# Patient Record
Sex: FEMALE | Race: AMERICAN INDIAN OR ALASKA NATIVE
[De-identification: names, ages, dates, MRNs, and addresses within clinical notes are randomized per-mention and may not be internally consistent; named-entity substitution may affect disease eponyms.]

---

## 2018-05-07 ENCOUNTER — HOSPITAL ENCOUNTER (EMERGENCY)
Dept: HOSPITAL 5 - ED | Age: 48
Discharge: HOME | End: 2018-05-07
Payer: SELF-PAY

## 2018-05-07 VITALS — SYSTOLIC BLOOD PRESSURE: 128 MMHG | DIASTOLIC BLOOD PRESSURE: 57 MMHG

## 2018-05-07 DIAGNOSIS — D50.9: Primary | ICD-10-CM

## 2018-05-07 DIAGNOSIS — Z91.040: ICD-10-CM

## 2018-05-07 DIAGNOSIS — F32.9: ICD-10-CM

## 2018-05-07 LAB
ALBUMIN SERPL-MCNC: 4 G/DL (ref 3.9–5)
ALT SERPL-CCNC: 9 UNITS/L (ref 7–56)
APTT BLD: 31 SEC. (ref 24.2–36.6)
BASOPHILS # (AUTO): 0 K/MM3 (ref 0–0.1)
BASOPHILS NFR BLD AUTO: 0.5 % (ref 0–1.8)
BUN SERPL-MCNC: 12 MG/DL (ref 7–17)
BUN/CREAT SERPL: 13 %
CALCIUM SERPL-MCNC: 9 MG/DL (ref 8.4–10.2)
EOSINOPHIL # BLD AUTO: 0.1 K/MM3 (ref 0–0.4)
EOSINOPHIL NFR BLD AUTO: 1 % (ref 0–4.3)
HCT VFR BLD CALC: 25.1 % (ref 30.3–42.9)
HEMOLYSIS INDEX: 19
HGB BLD-MCNC: 8.1 GM/DL (ref 10.1–14.3)
INR PPP: 1.04 (ref 0.87–1.13)
LIPASE SERPL-CCNC: 27 UNITS/L (ref 13–60)
LYMPHOCYTES # BLD AUTO: 0.8 K/MM3 (ref 1.2–5.4)
LYMPHOCYTES NFR BLD AUTO: 15.7 % (ref 13.4–35)
MCH RBC QN AUTO: 24 PG (ref 28–32)
MCHC RBC AUTO-ENTMCNC: 32 % (ref 30–34)
MCV RBC AUTO: 74 FL (ref 79–97)
MONOCYTES # (AUTO): 0.4 K/MM3 (ref 0–0.8)
MONOCYTES % (AUTO): 6.9 % (ref 0–7.3)
PLATELET # BLD: 392 K/MM3 (ref 140–440)
RBC # BLD AUTO: 3.38 M/MM3 (ref 3.65–5.03)

## 2018-05-07 PROCEDURE — 85025 COMPLETE CBC W/AUTO DIFF WBC: CPT

## 2018-05-07 PROCEDURE — 93010 ELECTROCARDIOGRAM REPORT: CPT

## 2018-05-07 PROCEDURE — 86901 BLOOD TYPING SEROLOGIC RH(D): CPT

## 2018-05-07 PROCEDURE — 85730 THROMBOPLASTIN TIME PARTIAL: CPT

## 2018-05-07 PROCEDURE — 84703 CHORIONIC GONADOTROPIN ASSAY: CPT

## 2018-05-07 PROCEDURE — 86900 BLOOD TYPING SEROLOGIC ABO: CPT

## 2018-05-07 PROCEDURE — 36415 COLL VENOUS BLD VENIPUNCTURE: CPT

## 2018-05-07 PROCEDURE — 83690 ASSAY OF LIPASE: CPT

## 2018-05-07 PROCEDURE — 93005 ELECTROCARDIOGRAM TRACING: CPT

## 2018-05-07 PROCEDURE — 99284 EMERGENCY DEPT VISIT MOD MDM: CPT

## 2018-05-07 PROCEDURE — 85379 FIBRIN DEGRADATION QUANT: CPT

## 2018-05-07 PROCEDURE — 80053 COMPREHEN METABOLIC PANEL: CPT

## 2018-05-07 PROCEDURE — 71046 X-RAY EXAM CHEST 2 VIEWS: CPT

## 2018-05-07 PROCEDURE — 96360 HYDRATION IV INFUSION INIT: CPT

## 2018-05-07 PROCEDURE — 85610 PROTHROMBIN TIME: CPT

## 2018-05-07 PROCEDURE — 70450 CT HEAD/BRAIN W/O DYE: CPT

## 2018-05-07 PROCEDURE — 86850 RBC ANTIBODY SCREEN: CPT

## 2018-05-07 NOTE — EMERGENCY DEPARTMENT REPORT
ED Shortness of Breath HPI





- General


Chief Complaint: Weakness


Stated Complaint: DIZZY


Time Seen by Provider: 05/07/18 10:21


Source: patient


Mode of arrival: Ambulatory


Limitations: No Limitations





- History of Present Illness


Initial Comments: 





47-year-old female with a past medical history of depression and iron 

deficiency anemia presents to the hospital with complains of dyspnea on exertion

, lightheadachedness, nausea, and fatigue that started upon waking this 

morning.  Patient has had similar symptoms in the past related to anemia as 

required blood iron transfusions.  She is not currently taking her iron 

tablets.  Patient also frequently travels in has been back and forth to 

[place] for the past 3 weeks and recently returned to [place] on the fourth.

  Patient denies chest pain, calf tenderness, edema, history of PE/DVT.  On the 

fourth she states she was at a hospital in [place] and states her hemoglobin 

was "6.1" she was treated with IV fluids without blood transfusion and was 

subsequently discharged.





- Related Data


 Home Medications











 Medication  Instructions  Recorded  Confirmed  Last Taken


 


Bupropion HCl [Wellbutrin XL] 300 mg PO QDAY 05/07/18 05/07/18 Unknown


 


Ergocalciferol [Vitamin D2] 1 cap PO QWEEK 05/07/18 05/07/18 Unknown


 


Melatonin 5 mg PO HS 05/07/18 05/07/18 Unknown


 


Multivit-Min/Iron/Folic/Exk442 1 each PO DAILY 05/07/18 05/07/18 Unknown





[Hair, Skin and Nails Tablet]    











 Allergies











Allergy/AdvReac Type Severity Reaction Status Date / Time


 


latex Allergy  Unknown Verified 05/07/18 08:39














ED Review of Systems


ROS: 


Stated complaint: DIZZY


Other details as noted in HPI





Comment: All other systems reviewed and negative





ED Past Medical Hx





- Past Medical History


Previous Medical History?: Yes


Hx Psychiatric Treatment: Yes (depression)


Additional medical history: Anemia, Iron transfusions





- Surgical History


Past Surgical History?: No





- Social History


Smoking Status: Never Smoker


Substance Use Type: Prescribed





- Medications


Home Medications: 


 Home Medications











 Medication  Instructions  Recorded  Confirmed  Last Taken  Type


 


Bupropion HCl [Wellbutrin XL] 300 mg PO QDAY 05/07/18 05/07/18 Unknown History


 


Ergocalciferol [Vitamin D2] 1 cap PO QWEEK 05/07/18 05/07/18 Unknown History


 


Melatonin 5 mg PO HS 05/07/18 05/07/18 Unknown History


 


Multivit-Min/Iron/Folic/Lzz446 1 each PO DAILY 05/07/18 05/07/18 Unknown History





[Hair, Skin and Nails Tablet]     














ED Physical Exam





- General


Limitations: No Limitations





- Other


Other exam information: 





General: No limitations, patient is alert in no acute distress


Head exam: Atraumatic, normocephalic


Eyes exam: Normal appearance, pupils equal reactive to light, extraocular 

movements intact.  Visual acuity without corrective lenses left eye 20/20, 

right eye 20/25, both eyes 20/25


ENT: Moist mucous membrane, normal oropharynx


Neck exam: Normal inspection, full range of motion, no meningismus nontender


Respiratory exam: Clear to auscultation bilateral, no wheezes, rales, crackles


Cardiovascular: Normal rate and rhythm, normal heart sounds


Abdomen: Soft, nondistended, and  nontender, with normal bowel sounds, no 

rebound, or guarding


Extremity: Full range of motion normal inspection no deformity, no calf 

tenderness or edema


Back: Normal Inspection, full range of motion, no tenderness


Neurologic: Alert, oriented x3, cranial nerves intact, no motor or sensory 

deficit


Psychiatric: normal affect, normal mood


Skin: Warm, dry, intact





ED Course


 Vital Signs











  05/07/18 05/07/18 05/07/18





  08:39 09:12 09:15


 


Temperature 98.4 F  


 


Pulse Rate 106 H 80 75


 


Pulse Rate [   





Lying]   


 


Pulse Rate [   





Sitting]   


 


Pulse Rate [   





Standing]   


 


Respiratory 18 28 H 30 H





Rate   


 


Blood Pressure 112/76  129/66


 


Blood Pressure   





[Left]   


 


Blood Pressure   





[Lying]   


 


Blood Pressure   





[Sitting]   


 


Blood Pressure   





[Standing]   


 


O2 Sat by Pulse 100 100 100





Oximetry   














  05/07/18 05/07/18 05/07/18





  09:30 09:33 09:45


 


Temperature   


 


Pulse Rate 64  65


 


Pulse Rate [   





Lying]   


 


Pulse Rate [   





Sitting]   


 


Pulse Rate [   





Standing]   


 


Respiratory 21 32 H 10 L





Rate   


 


Blood Pressure 121/64  121/64


 


Blood Pressure   





[Left]   


 


Blood Pressure   





[Lying]   


 


Blood Pressure   





[Sitting]   


 


Blood Pressure   





[Standing]   


 


O2 Sat by Pulse 100  100





Oximetry   














  05/07/18 05/07/18 05/07/18





  10:00 10:15 10:31


 


Temperature   


 


Pulse Rate 62 71 79


 


Pulse Rate [   





Lying]   


 


Pulse Rate [   





Sitting]   


 


Pulse Rate [   





Standing]   


 


Respiratory 16 12 22





Rate   


 


Blood Pressure 127/68 127/68 


 


Blood Pressure   120/73





[Left]   


 


Blood Pressure   





[Lying]   


 


Blood Pressure   





[Sitting]   


 


Blood Pressure   





[Standing]   


 


O2 Sat by Pulse 100 100 100





Oximetry   














  05/07/18 05/07/18





  12:00 12:15


 


Temperature  


 


Pulse Rate  


 


Pulse Rate [ 70 





Lying]  


 


Pulse Rate [ 69 





Sitting]  


 


Pulse Rate [ 73 





Standing]  


 


Respiratory  18





Rate  


 


Blood Pressure  


 


Blood Pressure  128/57





[Left]  


 


Blood Pressure 104/58 





[Lying]  


 


Blood Pressure 118/61 





[Sitting]  


 


Blood Pressure 112/64 





[Standing]  


 


O2 Sat by Pulse  100





Oximetry  














- Reevaluation(s)


Reevaluation #1: 





05/07/18 12:47


Patient room air O2 sat 100%.  Tolerating exertion without difficulty.  When 

reassessed at this time patient states that her head "feels fuzzy and cloudy".  

When asked to clarify she says her vision appears to be a little blurry.  

Apparently this has been present since her initial presentation but only 

mentioned at this point.  Patient denies any headache.  Neurologic exam 

performed NIH score of 0.  Visual acuity testing ordered a CT will be 

performed. Post IVF orthostatics unremarkable








ED Medical Decision Making





- Lab Data


Result diagrams: 


 05/07/18 08:57





 05/07/18 08:57








 Lab Results











  05/07/18 05/07/18 05/07/18 Range/Units





  08:57 08:57 08:57 


 


WBC  5.4    (4.5-11.0)  K/mm3


 


RBC  3.38 L    (3.65-5.03)  M/mm3


 


Hgb  8.1 L    (10.1-14.3)  gm/dl


 


Hct  25.1 L    (30.3-42.9)  %


 


MCV  74 L    (79-97)  fl


 


MCH  24 L    (28-32)  pg


 


MCHC  32    (30-34)  %


 


RDW  18.4 H    (13.2-15.2)  %


 


Plt Count  392    (140-440)  K/mm3


 


Lymph % (Auto)  15.7    (13.4-35.0)  %


 


Mono % (Auto)  6.9    (0.0-7.3)  %


 


Eos % (Auto)  1.0    (0.0-4.3)  %


 


Baso % (Auto)  0.5    (0.0-1.8)  %


 


Lymph #  0.8 L    (1.2-5.4)  K/mm3


 


Mono #  0.4    (0.0-0.8)  K/mm3


 


Eos #  0.1    (0.0-0.4)  K/mm3


 


Baso #  0.0    (0.0-0.1)  K/mm3


 


Seg Neutrophils %  75.9 H    (40.0-70.0)  %


 


Seg Neutrophils #  4.1    (1.8-7.7)  K/mm3


 


PT   14.1   (12.2-14.9)  Sec.


 


INR   1.04   (0.87-1.13)  


 


APTT   31.0   (24.2-36.6)  Sec.


 


D-Dimer     (0-234)  ng/mlDDU


 


Sodium    135 L  (137-145)  mmol/L


 


Potassium    4.1  (3.6-5.0)  mmol/L


 


Chloride    99.6  ()  mmol/L


 


Carbon Dioxide    21 L  (22-30)  mmol/L


 


Anion Gap    19  mmol/L


 


BUN    12  (7-17)  mg/dL


 


Creatinine    0.9  (0.7-1.2)  mg/dL


 


Estimated GFR    > 60  ml/min


 


BUN/Creatinine Ratio    13  %


 


Glucose    129 H  ()  mg/dL


 


Calcium    9.0  (8.4-10.2)  mg/dL


 


Total Bilirubin    0.20  (0.1-1.2)  mg/dL


 


AST    17  (5-40)  units/L


 


ALT    9  (7-56)  units/L


 


Alkaline Phosphatase    59  ()  units/L


 


Total Protein    7.3  (6.3-8.2)  g/dL


 


Albumin    4.0  (3.9-5)  g/dL


 


Albumin/Globulin Ratio    1.2  %


 


Lipase    27  (13-60)  units/L


 


HCG, Qual     (Negative)  


 


Blood Type     


 


Antibody Screen     














  05/07/18 05/07/18 05/07/18 Range/Units





  08:57 08:57 08:57 


 


WBC     (4.5-11.0)  K/mm3


 


RBC     (3.65-5.03)  M/mm3


 


Hgb     (10.1-14.3)  gm/dl


 


Hct     (30.3-42.9)  %


 


MCV     (79-97)  fl


 


MCH     (28-32)  pg


 


MCHC     (30-34)  %


 


RDW     (13.2-15.2)  %


 


Plt Count     (140-440)  K/mm3


 


Lymph % (Auto)     (13.4-35.0)  %


 


Mono % (Auto)     (0.0-7.3)  %


 


Eos % (Auto)     (0.0-4.3)  %


 


Baso % (Auto)     (0.0-1.8)  %


 


Lymph #     (1.2-5.4)  K/mm3


 


Mono #     (0.0-0.8)  K/mm3


 


Eos #     (0.0-0.4)  K/mm3


 


Baso #     (0.0-0.1)  K/mm3


 


Seg Neutrophils %     (40.0-70.0)  %


 


Seg Neutrophils #     (1.8-7.7)  K/mm3


 


PT     (12.2-14.9)  Sec.


 


INR     (0.87-1.13)  


 


APTT     (24.2-36.6)  Sec.


 


D-Dimer   136.72   (0-234)  ng/mlDDU


 


Sodium     (137-145)  mmol/L


 


Potassium     (3.6-5.0)  mmol/L


 


Chloride     ()  mmol/L


 


Carbon Dioxide     (22-30)  mmol/L


 


Anion Gap     mmol/L


 


BUN     (7-17)  mg/dL


 


Creatinine     (0.7-1.2)  mg/dL


 


Estimated GFR     ml/min


 


BUN/Creatinine Ratio     %


 


Glucose     ()  mg/dL


 


Calcium     (8.4-10.2)  mg/dL


 


Total Bilirubin     (0.1-1.2)  mg/dL


 


AST     (5-40)  units/L


 


ALT     (7-56)  units/L


 


Alkaline Phosphatase     ()  units/L


 


Total Protein     (6.3-8.2)  g/dL


 


Albumin     (3.9-5)  g/dL


 


Albumin/Globulin Ratio     %


 


Lipase     (13-60)  units/L


 


HCG, Qual    Negative  (Negative)  


 


Blood Type  A POSITIVE    


 


Antibody Screen  Negative    














- EKG Data


-: EKG Interpreted by Me


EKG shows normal: sinus rhythm, axis (qrs 60), QRS complexes (qrsd 96), ST-T 

waves (no stemi/t inv)


Rate: normal (71)





- EKG Data


When compared to previous EKG there are: previous EKG unavailable





- Radiology Data


Radiology results: report reviewed





cxr: [facility]





ct head: naf





- Medical Decision Making





Wells criteria for PE score equals 0 plus negative d-dimer


cxr neg


anemia with hgb 8.1


encouraged to continue iron tablets


f/u with her hematologist at [facility] this week





- Differential Diagnosis


PE, anemia, anxiety, dehydration, arrhythmia


Critical Care Time: No


Critical care attestation.: 


If time is entered above; I have spent that time in minutes in the direct care 

of this critically ill patient, excluding procedure time.








ED Disposition


Clinical Impression: 


 Light-headed feeling, Iron deficiency anemia





Disposition: DC-01 TO HOME OR SELFCARE


Is pt being admited?: No


Does the pt Need Aspirin: No


Condition: Stable


Instructions:  Iron Deficiency Anemia (ED), Lightheadedness (ED)


Additional Instructions: 


Taking iron tablets as prescribed.  Follow-up with your hematologist.  Return 

if symptoms worsen as indicated by your discharge instructions


Referrals: 


[facility] [Primary Care Provider] - 3-5 Days


[facility] [Provider Group] - 3-5 Days


Time of Disposition: 13:49





- Assessment


Assessment Interval: Baseline





- Level of Consciousness


1a. Level of Consciousness: alert





- LOC Questions


1b. LOC Questions: answers correctly





- LOC Command


1c. LOC Commands: performs tasks correctly





- Best Gaze


2. Best Gaze: normal





- Visual


3. Visual: no visual loss





- Facial Palsy


4. Facial Palsy: normal symmetrical movement





- Motor Arm


5b. Motor Arm Right: no drift


5a. Motor Arm Left: no drift





- Motor Leg


6a. Motor Leg Left: no drift


6b. Motor Leg Right: no drift





- Limb Ataxia


7. Limb Ataxia: absent





- Sensory


8. Sensory: normal





- Best Language


9. Best Language: no aphasia





- Dysarthria


10. Dysarthria: normal





- Extinction and Inattention


11. Extinction/Inattention: no abnormality





- Scoring


Total Score: 0


Stroke Severity: No Stroke Symptoms

## 2018-05-07 NOTE — CAT SCAN REPORT
CT HEAD WITHOUT CONTRAST:



HISTORY:  Light headedness, cloudy vision.



TECHNIQUE:  Sequential 2.5mm CT images.



COMPARISON: none.



FINDINGS:



Cerebral Parenchyma: Within normal limits.



Cerebellum:  Within normal limits.



Brainstem:  Within normal limits.



Ventricles: Normal.



Sella:  Normal.



Extra-axial spaces:  Normal.



Basal Cisterns:  Normal.



Intracranial Hemorrhage:  None.



Midline Shift:  None.



Calvarium: Normal.



Sinuses: Normal.



Mastoid Air Cells:  Normal.



Visualized Orbits:  Normal.







IMPRESSION:

Cranial CT scan within normal limits.

## 2018-05-07 NOTE — XRAY REPORT
ROUTINE CHEST, TWO VIEWS:



HISTORY:  Dyspnea on exertion.



The trachea, heart, mediastinal contour, lung fields and bony thorax 

are unremarkable.



IMPRESSION:

Unremarkable chest x-ray.

## 2022-01-11 ENCOUNTER — P2P PATIENT RECORD (OUTPATIENT)
Age: 52
End: 2022-01-11

## 2023-04-13 ENCOUNTER — P2P PATIENT RECORD (OUTPATIENT)
Age: 53
End: 2023-04-13

## 2024-06-11 ENCOUNTER — P2P PATIENT RECORD (OUTPATIENT)
Age: 54
End: 2024-06-11

## 2024-07-02 ENCOUNTER — LAB OUTSIDE AN ENCOUNTER (OUTPATIENT)
Dept: URBAN - METROPOLITAN AREA SURGERY CENTER 23 | Facility: SURGERY CENTER | Age: 54
End: 2024-07-02

## 2024-07-03 ENCOUNTER — CLAIMS CREATED FROM THE CLAIM WINDOW (OUTPATIENT)
Dept: URBAN - METROPOLITAN AREA SURGERY CENTER 23 | Facility: SURGERY CENTER | Age: 54
End: 2024-07-03
Payer: COMMERCIAL

## 2024-07-03 ENCOUNTER — CLAIMS CREATED FROM THE CLAIM WINDOW (OUTPATIENT)
Dept: URBAN - METROPOLITAN AREA SURGERY CENTER 23 | Facility: SURGERY CENTER | Age: 54
End: 2024-07-03

## 2024-07-03 ENCOUNTER — CLAIMS CREATED FROM THE CLAIM WINDOW (OUTPATIENT)
Dept: URBAN - METROPOLITAN AREA CLINIC 4 | Facility: CLINIC | Age: 54
End: 2024-07-03
Payer: COMMERCIAL

## 2024-07-03 DIAGNOSIS — D12.3 ADENOMA OF TRANSVERSE COLON: ICD-10-CM

## 2024-07-03 DIAGNOSIS — Z12.11 COLON CANCER SCREENING: ICD-10-CM

## 2024-07-03 DIAGNOSIS — D12.3 BENIGN NEOPLASM OF TRANSVERSE COLON: ICD-10-CM

## 2024-07-03 PROCEDURE — 88305 TISSUE EXAM BY PATHOLOGIST: CPT | Performed by: PATHOLOGY

## 2024-07-03 PROCEDURE — 00812 ANES LWR INTST SCR COLSC: CPT | Performed by: NURSE ANESTHETIST, CERTIFIED REGISTERED

## 2024-07-03 PROCEDURE — 45380 COLONOSCOPY AND BIOPSY: CPT | Performed by: INTERNAL MEDICINE

## 2024-08-05 ENCOUNTER — TELEPHONE ENCOUNTER (OUTPATIENT)
Dept: URBAN - METROPOLITAN AREA CLINIC 128 | Facility: CLINIC | Age: 54
End: 2024-08-05